# Patient Record
Sex: FEMALE | Race: WHITE
[De-identification: names, ages, dates, MRNs, and addresses within clinical notes are randomized per-mention and may not be internally consistent; named-entity substitution may affect disease eponyms.]

---

## 2017-03-02 ENCOUNTER — HOSPITAL ENCOUNTER (EMERGENCY)
Dept: HOSPITAL 62 - ER | Age: 22
Discharge: HOME | End: 2017-03-02
Payer: SELF-PAY

## 2017-03-02 VITALS — SYSTOLIC BLOOD PRESSURE: 122 MMHG | DIASTOLIC BLOOD PRESSURE: 77 MMHG

## 2017-03-02 DIAGNOSIS — J01.90: Primary | ICD-10-CM

## 2017-03-02 DIAGNOSIS — R05: ICD-10-CM

## 2017-03-02 DIAGNOSIS — F17.200: ICD-10-CM

## 2017-03-02 DIAGNOSIS — R09.82: ICD-10-CM

## 2017-03-02 DIAGNOSIS — R09.81: ICD-10-CM

## 2017-03-02 DIAGNOSIS — J34.89: ICD-10-CM

## 2017-03-02 PROCEDURE — 99283 EMERGENCY DEPT VISIT LOW MDM: CPT

## 2017-03-02 NOTE — ER DOCUMENT REPORT
ED Medical Screen (RME)





- General


Stated Complaint: SINUS CONGESTION


Mode of Arrival: Ambulatory


Information source: Patient


Notes: 


c/o sinus congestion, pain and pressure with green rhinorrhea, headache and 

chest congestion with cough (green mucus) for the past week. Denies fever, 

chills, nausea, vomiting, or diarrhea. 


She has tried allergy medication, OTC decongestants which has not helped. 

Endorses sick contacts.





I have greeted and performed a rapid initial assessment of this patient. A 

comprehensive ED assessment and evaluation of the patient, analysis of test 

results and completion of the medical decision making process will be conducted 

by additional ED providers.


TRAVEL OUTSIDE OF THE [place] IN LAST 30 DAYS: No





- Related Data


Allergies/Adverse Reactions: 


 





amoxicillin [Amoxicillin] Allergy (Severe, Verified 03/02/17 17:28)


 Swelling, Hives











Past Medical History





- Past Medical History


Cardiac Medical History: 


   Denies: Hx Coronary Artery Disease, Hx Heart Attack, Hx Hypertension


Pulmonary Medical History: 


   Denies: Hx Asthma, Hx Bronchitis, Hx COPD, Hx Pneumonia


Neurological Medical History: Denies: Hx Cerebrovascular Accident, Hx Seizures


GI Medical History: 


Musculoskeltal Medical History: Denies Hx Arthritis, Reports Hx Musculoskeletal 

Trauma


Infectious Medical History: 


Past Surgical History: Reports: Hx Cholecystectomy, Hx Hysterectomy.  Denies: 

Hx Pacemaker





- Immunizations


Hx Diphtheria, Pertussis, Tetanus Vaccination: No - unsure





Physical Exam





- Vital signs


Vitals: 


 











Temp Pulse Resp BP Pulse Ox


 


 98.1 F   88   16   141/91 H  100 


 


 03/02/17 17:28  03/02/17 17:28  03/02/17 17:28  03/02/17 17:28  03/02/17 17:28














Course





- Vital Signs


Vital signs: 


 











Temp Pulse Resp BP Pulse Ox


 


 98.1 F   88   16   141/91 H  100 


 


 03/02/17 17:28  03/02/17 17:28  03/02/17 17:28  03/02/17 17:28  03/02/17 17:28

## 2017-03-02 NOTE — ER DOCUMENT REPORT
HPI





- HPI


Patient complains to provider of: sinus drainage and pressure


Onset: Other - 1 week


Onset/Duration: Gradual


Pain Level: Denies


Context: 


21-year-old female with upper respiratory infection for over a week with sinus 

pressure and congestion.  Dark green nasal discharge when she blows her nose 

especially at night.  She has post nasal drip with occasional cough.  She is a 

smoker.  No fever.


Associated Symptoms: None


Exacerbated by: Denies


Relieved by: Denies


Similar symptoms previously: No


Recently seen / treated by doctor: No





- ROS


ROS below otherwise negative: Yes


Systems Reviewed and Negative: Yes All other systems reviewed and negative





- REPRODUCTIVE


LMP: 2/22/17


Reproductive: DENIES: Pregnant:





- DERM


Skin Color: Normal





Past Medical History





- General


Information source: Patient





- Social History


Smoking Status: Current Every Day Smoker


Chew tobacco use (# tins/day): No


Frequency of alcohol use: None


Drug Abuse: None


Family History: Reviewed & Not Pertinent


Patient has suicidal ideation: No


Patient has homicidal ideation: No





- Past Medical History


Cardiac Medical History: 


Pulmonary Medical History: 


Renal/ Medical History: Denies: Hx Peritoneal Dialysis


GI Medical History: 


Musculoskeltal Medical History: Reports Hx Musculoskeletal Trauma


Infectious Medical History: 


Past Surgical History: Reports: Hx Cholecystectomy, Hx Hysterectomy





- Immunizations


Hx Diphtheria, Pertussis, Tetanus Vaccination: No - unsure





Vertical Provider Document





- CONSTITUTIONAL


Agree With Documented VS: Yes


Exam Limitations: No Limitations





- INFECTION CONTROL


TRAVEL OUTSIDE OF THE [place] IN LAST 30 DAYS: No





- HEENT


HEENT: Normocephalic, Pharyngeal Erythema.  negative: Conjuctival Injection, 

Tympanic Membrane Red, Tympanic Membrane Bulging


Notes: 


Boggy naris





- NECK


Neck: Supple.  negative: Lymphadenopathy-Left, Lymphadenopathy-Right





- RESPIRATORY


Respiratory: Breath Sounds Normal, No Respiratory Distress


O2 Sat by Pulse Oximetry: 100





- CARDIOVASCULAR


Cardiovascular: Regular Rate, Regular Rhythm





- GI/ABDOMEN


Gastrointestinal: Abdomen Soft, Abdomen Non-Tender, No Organomegaly





- MUSCULOSKELETAL/EXTREMETIES


Musculoskeletal/Extremeties: MAEW, FROM





- NEURO


Level of Consciousness: Awake, Alert





- DERM


Integumentary: Warm, Dry, No Rash





Course





- Vital Signs


Vital signs: 


 











Temp Pulse Resp BP Pulse Ox


 


 98.1 F   88   16   141/91 H  100 


 


 03/02/17 17:28  03/02/17 17:28  03/02/17 17:28  03/02/17 17:28  03/02/17 17:28














Discharge





- Discharge


Clinical Impression: 


Sinusitis


Qualifiers:


 Sinusitis location: unspecified location Chronicity: acute Recurrence: non-

recurrent Qualified Code(s): J01.90 - Acute sinusitis, unspecified





Condition: Good


Disposition: HOME, SELF-CARE


Instructions:  Sinusitis ([facility]), Azithromycin ([facility]), Stop Smoking ([facility])


Additional Instructions: 


saline nasal spray four times per day


stop smoking


to er if worse


see your provider if persists


warm compress of the sinus


Prescriptions: 


Azithromycin [Zithromax] 250 mg PO DAILY #6 tablet


Forms:  Return to Work

## 2017-07-14 ENCOUNTER — HOSPITAL ENCOUNTER (EMERGENCY)
Dept: HOSPITAL 62 - ER | Age: 22
Discharge: HOME | End: 2017-07-14
Payer: COMMERCIAL

## 2017-07-14 VITALS — DIASTOLIC BLOOD PRESSURE: 76 MMHG | SYSTOLIC BLOOD PRESSURE: 125 MMHG

## 2017-07-14 DIAGNOSIS — Z90.49: ICD-10-CM

## 2017-07-14 DIAGNOSIS — Z90.710: ICD-10-CM

## 2017-07-14 DIAGNOSIS — N76.0: ICD-10-CM

## 2017-07-14 DIAGNOSIS — B96.89: ICD-10-CM

## 2017-07-14 DIAGNOSIS — R30.0: Primary | ICD-10-CM

## 2017-07-14 LAB
APPEARANCE UR: (no result)
BILIRUB UR QL STRIP: NEGATIVE
CHLAM PCR: NOT DETECTED
GLUCOSE UR STRIP-MCNC: NEGATIVE MG/DL
KETONES UR STRIP-MCNC: NEGATIVE MG/DL
NITRITE UR QL STRIP: NEGATIVE
PH UR STRIP: 6 [PH] (ref 5–9)
PROT UR STRIP-MCNC: 30 MG/DL
SP GR UR STRIP: 1.02
UROBILINOGEN UR-MCNC: NEGATIVE MG/DL (ref ?–2)

## 2017-07-14 PROCEDURE — 87591 N.GONORRHOEAE DNA AMP PROB: CPT

## 2017-07-14 PROCEDURE — 96372 THER/PROPH/DIAG INJ SC/IM: CPT

## 2017-07-14 PROCEDURE — 87186 SC STD MICRODIL/AGAR DIL: CPT

## 2017-07-14 PROCEDURE — 99283 EMERGENCY DEPT VISIT LOW MDM: CPT

## 2017-07-14 PROCEDURE — 87210 SMEAR WET MOUNT SALINE/INK: CPT

## 2017-07-14 PROCEDURE — 87088 URINE BACTERIA CULTURE: CPT

## 2017-07-14 PROCEDURE — 87491 CHLMYD TRACH DNA AMP PROBE: CPT

## 2017-07-14 PROCEDURE — 81001 URINALYSIS AUTO W/SCOPE: CPT

## 2017-07-14 PROCEDURE — 87086 URINE CULTURE/COLONY COUNT: CPT

## 2017-07-14 NOTE — ER DOCUMENT REPORT
HPI





- HPI


Pain Level: 3


Notes: 


Patient is a 21-year-old female presents the ED complaining of urinary burning, 

urgency, frequency 1 day.  Patient states that she also developed hematuria 

this morning and pain with urination.  Patient states that she does not drink 

enough water as it is, but is still able to eat and drink without any problems.

  Patient states that she does have a history of UTIs and that the symptoms are 

similar to the past.  Patient does note some lower abdominal pressure but no 

sharp pain.  Denies any back pain.  She has not had any medications for her 

symptoms.  Patient states she is allergic to amoxicillin, develops rash.  She 

does not take any medications daily.  No other significant past medical 

history.  No recent illness, travel, sick contacts.  Patient does smoke but 

does not do any other IV drugs.  Denies any urethral or vaginal discharge.  

Denies any headache, fever, URI, sore throat, cough, wheeze, shortness of breath

, chest pain, palpitations, syncope, nausea/vomiting/diarrhea, urinary retention

, joint pains, or rash.





- ROS


Notes: 


REVIEW OF SYSTEMS:


CONSTITUTIONAL :  Denies fever,  chills, or sweats.  Denies recent illness.


EENT:   Denies eye, ear, throat, or mouth pain or symptoms.  Denies nasal or 

sinus congestion or discharge.  Denies throat, tongue, or mouth swelling or 

difficulty swallowing.


CARDIOVASCULAR:  Denies chest pain.  Denies palpitations or racing or irregular 

heart beat.  Denies ankle edema.


RESPIRATORY:  Denies cough, cold, or chest congestion.  Denies shortness of 

breath, difficulty breathing, or wheezing.


GASTROINTESTINAL:  Denies abdominal pain or distention.  Denies nausea, vomiting

, or diarrhea.  Denies blood in vomitus, stools, or per rectum.  Denies black, 

tarry stools.  Denies constipation.  


GENITOURINARY: See HPI


MUSCULOSKELETAL:  Denies back or neck pain or stiffness.  Denies joint pain or 

swelling.


SKIN:   Denies rash, lesions or sores.





ALL OTHER SYSTEMS REVIEWED AND NEGATIVE.





Dictation was performed using Dragon voice recognition software





- REPRODUCTIVE


Reproductive: DENIES: Pregnant:





- DERM


Skin Color: Normal





Past Medical History





- Social History


Smoking Status: Unknown if Ever Smoked


Family History: Reviewed & Not Pertinent


Patient has suicidal ideation: No


Patient has homicidal ideation: No





- Past Medical History


Cardiac Medical History: 


   Denies: Hx Coronary Artery Disease, Hx Heart Attack, Hx Hypertension


Pulmonary Medical History: 


   Denies: Hx Asthma, Hx Bronchitis, Hx COPD, Hx Pneumonia


Neurological Medical History: Denies: Hx Cerebrovascular Accident, Hx Seizures


Renal/ Medical History: Denies: Hx Peritoneal Dialysis


GI Medical History: 


Musculoskeltal Medical History: Denies Hx Arthritis, Reports Hx Musculoskeletal 

Trauma


Infectious Medical History: 


Past Surgical History: Reports: Hx Cholecystectomy, Hx Hysterectomy.  Denies: 

Hx Pacemaker





- Immunizations


Hx Diphtheria, Pertussis, Tetanus Vaccination: No - unsure





Vertical Provider Document





- CONSTITUTIONAL


Agree With Documented VS: Yes


Notes: 


PHYSICAL EXAMINATION:





GENERAL: Well-appearing, well-nourished and in no acute distress.





NECK: Normal range of motion, supple without lymphadenopathy





LUNGS: Breath sounds clear to auscultation bilaterally and equal.  No wheezes 

rales or rhonchi.





HEART: Regular rate and rhythm without murmurs, rubs, gallops.





ABDOMEN: Soft, nontender, nondistended abdomen.  No guarding, no rebound.  No 

masses appreciated.  Normal bowel sounds present.  No CVA tenderness 

bilaterally.





Female : No inguinal adenopathy.  External genitalia without erythema, lesions

, or masses.  Vaginal mucosa pink.  Cervix parous, pink, and without discharge.

  Uterus is smooth.  No adnexal tenderness. No CMT.





PSYCH: Normal mood, normal affect.





SKIN: Warm, Dry, normal turgor, no rashes or lesions noted.














- INFECTION CONTROL


TRAVEL OUTSIDE OF THE [place] IN LAST 30 DAYS: No





- RESPIRATORY


O2 Sat by Pulse Oximetry: 99





Course





- Re-evaluation


Re-evalutation: 


07/14/17 15:45


Patient is an afebrile, well-hydrated, 21-year-old female presents the ED with 

dysuria, suspect UTI based on H&P today.   Pelvic was performed along with wet 

mount and Chalm/Gonzalez to further assess as wbc clumps were noted in the urine and 

pt noted to have unprotected sex since having her IUD placement 6mos ago.  Wet 

mount shows some bacteria, most likely bacterial vaginosis.  Chlam/Gonzalez pending--

will call pt with results.  1g zithromax and 250mg IM rocephin given as 

precaution today.  Urinalysis shows bacteria byproduct with blood cells.  Urine 

culture pending. I will cover her with Keflex twice a day for 7 days and flagyl 

500mg PO BID x7 days.  Conservative measures otherwise for symptoms.  Recheck 

with your PCM/establish with a PCM this week.  Consider consult with urology.  

Return to the ED with any worsening/concerning symptoms otherwise as reviewed 

in discharge.  Patient is in agreement.














07/14/17 19:47


Chlam/gonzalez negative.  called patient.  no new concerns.





- Vital Signs


Vital signs: 


 











Temp Pulse Resp BP Pulse Ox


 


 98.1 F   55 L  14   126/74 H  99 


 


 07/14/17 11:14  07/14/17 11:14  07/14/17 11:14  07/14/17 11:14  07/14/17 11:14














Procedures





- Pelvic Exam


  ** Pelvic exam


Time completed: 13:40


Cultures obtained: Yes


Wet prep obtained: Yes


Herpes culture obtained: No


Foreign body removed: No


Bimanual exam performed: Yes - negative


Witnessed by: Nurse: Cesia





Discharge





- Discharge


Clinical Impression: 


 Dysuria, Bacterial vaginosis





Condition: Stable


Disposition: HOME, SELF-CARE


Instructions:  Urinary Tract Infection ([facility])


Additional Instructions: 


Push fluids (i.e. water, cranberry juice)


Proper hygenic technique


Keep the skin clean


Take medications as directed


Practice safe sex and use condoms


Tylenol/ibuprofen as needed


May use over the counter AZO for burning with urination


Take medications as directed


F/u/establish with your PCM this week for a recheck


Consider consult with a Urologist for ongoing/worsening symptoms.


Return to the ED with any development of fever, headache, worsening pain, 

increased blood in the urine, flank pain, abdominal pain, n/v, diarrhea, CP, 

shortness of breath, trouble breathing, or any other worsening/concerning 

symptoms otherwise as needed.


Prescriptions: 


Cephalexin Monohydrate [Keflex 500 mg Capsule] 500 mg PO BID #14 capsule


Metronidazole [Flagyl 500 mg Tablet] 500 mg PO BID #14 tablet


Referrals: 


[facility] [Provider Group] - Follow up as needed


[facility] [Provider Group] - Follow up as needed


[facility] [Provider Group] - Follow up as needed

## 2017-09-14 ENCOUNTER — HOSPITAL ENCOUNTER (EMERGENCY)
Dept: HOSPITAL 62 - ER | Age: 22
Discharge: HOME | End: 2017-09-14
Payer: MEDICAID

## 2017-09-14 VITALS — SYSTOLIC BLOOD PRESSURE: 120 MMHG | DIASTOLIC BLOOD PRESSURE: 75 MMHG

## 2017-09-14 DIAGNOSIS — Z88.0: ICD-10-CM
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
## 2017-09-14 DIAGNOSIS — Z90.49: ICD-10-CM

## 2017-09-14 DIAGNOSIS — S90.31XA: Primary | ICD-10-CM
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
## 2017-09-14 DIAGNOSIS — Z90.710: ICD-10-CM

## 2017-09-14 PROCEDURE — 99283 EMERGENCY DEPT VISIT LOW MDM: CPT

## 2017-09-14 PROCEDURE — 73630 X-RAY EXAM OF FOOT: CPT

## 2017-09-14 NOTE — RADIOLOGY REPORT (SQ)
EXAM DESCRIPTION:  FOOT RIGHT COMPLETE



COMPLETED DATE/TIME:  9/14/2017 12:19 pm



REASON FOR STUDY:  foot pain



COMPARISON:  None.



NUMBER OF VIEWS:  Three views.



TECHNIQUE:  AP, lateral and oblique  radiographic images acquired of the right foot.



LIMITATIONS:  None.



FINDINGS:  MINERALIZATION: Normal.

BONES: No acute fracture or dislocation.  No worrisome bone lesions.

JOINTS: No effusions.

SOFT TISSUES: No soft tissue swelling.  No foreign body.

OTHER: No other significant finding.



IMPRESSION:  NEGATIVE STUDY OF THE RIGHT FOOT. NO RADIOGRAPHIC EVIDENCE OF ACUTE INJURY.



TECHNICAL DOCUMENTATION:  JOB ID:  [id number]

 2011 [employer]- All Rights Reserved

## 2017-09-14 NOTE — ER DOCUMENT REPORT
ED General





- General


Stated Complaint: RIGHT FOOT INJURY


Time Seen by Provider: 09/14/17 11:45


Mode of Arrival: Ambulatory


Information source: Patient


Notes: 





Patient is a 22 year old female who presents with right foot pain and swelling 

that started yesterday morning. She states she was running from a lizard on her 

porch and she kicked the edge of her porch. She reports pain is intermittently 

dull and sharp. She is unable to bear weight on right foot. She has tried 

tylenol, last dose this morning, which is not providing relief. Denies any 

numbness, tingling, erythema, ecchymosis or warmth.


TRAVEL OUTSIDE OF THE [place] IN LAST 30 DAYS: No





- Related Data


Allergies/Adverse Reactions: 


 





amoxicillin [Amoxicillin] Allergy (Severe, Verified 03/02/17 17:28)


 Swelling, Hives











Past Medical History





- General


Information source: Patient





- Social History


Smoking Status: Unknown if Ever Smoked


Family History: Reviewed & Not Pertinent





- Past Medical History


Cardiac Medical History: 


   Denies: Hx Coronary Artery Disease, Hx Heart Attack, Hx Hypertension


Pulmonary Medical History: 


   Denies: Hx Asthma, Hx Bronchitis, Hx COPD, Hx Pneumonia


Neurological Medical History: Denies: Hx Cerebrovascular Accident, Hx Seizures


Renal/ Medical History: Denies: Hx Peritoneal Dialysis


GI Medical History: 


Musculoskeltal Medical History: Denies Hx Arthritis, Reports Hx Musculoskeletal 

Trauma


Infectious Medical History: 


Past Surgical History: Reports: Hx Cholecystectomy, Hx Hysterectomy.  Denies: 

Hx Pacemaker





- Immunizations


Hx Diphtheria, Pertussis, Tetanus Vaccination: No - unsure





Review of Systems





- Review of Systems


Constitutional: No symptoms reported


EENT: No symptoms reported


Cardiovascular: No symptoms reported


Respiratory: No symptoms reported


Gastrointestinal: No symptoms reported


Genitourinary: No symptoms reported


Female Genitourinary: No symptoms reported


Musculoskeletal: See HPI


Skin: No symptoms reported


Hematologic/Lymphatic: No symptoms reported


Neurological/Psychological: No symptoms reported





Physical Exam





- Vital signs


Vitals: 


 











Temp Pulse Resp BP Pulse Ox


 


 98.4 F   107 H  16   113/79   99 


 


 09/14/17 11:46  09/14/17 11:46  09/14/17 11:46  09/14/17 11:46  09/14/17 11:46











Interpretation: Tachycardic





- Notes


Notes: 





PHYSICAL EXAM:


CONSTITUTIONAL: Alert and oriented, well-appearing and in no acute distress. 


HENT: Normocephalic, atraumatic.  Moist mucous membranes.


EYES: Pupils equal round and reactive to light, EOM intact. Sclera anicteric, 

conjunctiva are normal. No entrapment. 


HEART: Regular rate and rhythm without murmurs. 


LUNGS: CTAB and equal. No wheezes, rales or rhonchi. 


BACK: nontender, no paraspinous spasm, 5+/5 strengths, DTRs 2+, SLR -. 


EXTREMITIES: right foot - tender to palpation along medial plantar surface with 

swelling and ecchymosis, normal ROM of ankle and toes, no pitting edema. No 

cyanosis. Cap Refill <3 seconds. distal pulses intact.


NEURO: Cranial nerves grossly intact. Normal sensory/motor exams.


PSYCH: Normal mood, normal affect. 


SKIN: Warm and dry. Normal turgor. No rashes or lesions noted.





Course





- Re-evaluation


Re-evalutation: 





09/14/17 11:55


Patient seen and examined. Neurovascular intact. Normal ROM. Will get xray and 

give motrin for pain.





09/14/17 13:41


Reviewed imaging studies - no fractures noted. Offered post-op shoe for comfort

, patient refused. Will give script for pain medication. Discussed supportive 

instructions. 





At this time, will discharge with return precautions and follow-up 

recommendations. Verbal discharge instructions given at the bedside and 

opportunity for questions given. Medication warnings reviewed. Patient is in 

agreement with this plan and has verbalized understanding of return precautions 

and the need for primary care follow-up in the next 24-72 hours. 








- Vital Signs


Vital signs: 


 











Temp Pulse Resp BP Pulse Ox


 


 98.4 F   107 H  16   113/79   99 


 


 09/14/17 11:46  09/14/17 11:46  09/14/17 11:46  09/14/17 11:46  09/14/17 11:46














- Diagnostic Test


Radiology reviewed: Image reviewed, Reports reviewed





Discharge





- Discharge


Clinical Impression: 


 Contusion of right foot, initial encounter





Condition: Stable


Disposition: HOME, SELF-CARE


Additional Instructions: 


Contusion





     Your injury has resulted in a contusion -- a crushing of the deep tissues.

  No injury to important structures was detected during the physician's exam.  

Contusions vary in the amount of pain they cause, and in the length of time 

required for healing.  Typically, the area will become bruised, and will remain 

painful to touch for two or three weeks.  However, most patients are back to 

working and playing within a few days.


     After the initial period of rest and cold-packs, your symptoms (together 

with the doctor's recommendations) will determine how rapidly you can get back 

to full activity.  Usually this means "do what feels okay, but don't do things 

that hurt."


     If re-examination was recommended, it's important to follow up as 

instructed.  Call the doctor or return any time if pain increases, if swelling 

becomes severe, if you develop numbness or weakness in an injured extremity, or 

if any other alarming symptoms occur.





Anti-Inflammatory Medication





     You have received a prescription for an antiinflammatory agent. This is an 

excellent, safe drug for pain control.  In addition, it has potent 

antiinflammatory effects which are beneficial, especially in the treatment of 

injuries, arthritis, or tendonitis.


     It's best to take this medicine with food.  Persons with ulcer disease or 

allergy to aspirin should notify their physician of this before taking this 

drug.


     Take the medication exactly as prescribed.  Don't take additional doses 

unless instructed to do so by your doctor.  If you develop wheezing, shortness 

of breath, hives, faintness, stomach pain, vomiting, or dark black stools, 

return for re-evaluation at once.





Follow up with the provider of your choice in one to two weeks.


Prescriptions: 


Ketorolac Tromethamine [Toradol 10 mg Tablet] 10 mg PO Q8HP PRN #20 tablet


 PRN Reason:

## 2017-09-25 ENCOUNTER — HOSPITAL ENCOUNTER (EMERGENCY)
Dept: HOSPITAL 62 - ER | Age: 22
Discharge: HOME | End: 2017-09-25
Payer: MEDICAID

## 2017-09-25 VITALS — DIASTOLIC BLOOD PRESSURE: 73 MMHG | SYSTOLIC BLOOD PRESSURE: 143 MMHG

## 2017-09-25 DIAGNOSIS — K08.9: Primary | ICD-10-CM

## 2017-09-25 DIAGNOSIS — R03.0: ICD-10-CM

## 2017-09-25 DIAGNOSIS — Z90.710: ICD-10-CM

## 2017-09-25 DIAGNOSIS — F17.200: ICD-10-CM

## 2017-09-25 DIAGNOSIS — Z90.49: ICD-10-CM

## 2017-09-25 PROCEDURE — 99282 EMERGENCY DEPT VISIT SF MDM: CPT

## 2017-09-25 NOTE — ER DOCUMENT REPORT
HPI





- HPI


Patient complains to provider of: Dental pain


Onset: Other - 3 days


Onset/Duration: Persistent


Quality of pain: Sharp


Pain Level: 5


Context: 





Patient presents complaining of dental pain to left upper and lower jaw.  

Patient states she has had a root canal but was unable to afford the crown and 

was told that her tooth would eventually start to give her problems.  Patient 

denies any fever or facial swelling.  Patient states she has been taking 

Tylenol without improvement of her symptoms.


Associated Symptoms: Other - Dental pain.  denies: Fever


Exacerbated by: Denies


Relieved by: Denies


Similar symptoms previously: Yes


Recently seen / treated by doctor: No





- ROS


ROS below otherwise negative: Yes


Systems Reviewed and Negative: Yes All other systems reviewed and negative





- CONSTITUTIONAL


Constitutional: DENIES: Fever





- EENT


Notes: 





Dental pain





- RESPIRATORY


Respiratory: DENIES: Coughing





- GASTROINTESTINAL


Gastrointestinal: DENIES: Nausea





- REPRODUCTIVE


Reproductive: DENIES: Pregnant:





- DERM


Skin Color: Normal


Skin Problems: None





Past Medical History





- General


Information source: Patient





- Social History


Smoking Status: Current Every Day Smoker


Frequency of alcohol use: None


Drug Abuse: None


Occupation: Foodservice


Family History: Reviewed & Not Pertinent





- Past Medical History


Cardiac Medical History: 


   Denies: Hx Coronary Artery Disease, Hx Heart Attack, Hx Hypertension


Pulmonary Medical History: 


   Denies: Hx Asthma, Hx Bronchitis, Hx COPD, Hx Pneumonia


Neurological Medical History: Denies: Hx Cerebrovascular Accident, Hx Seizures


Renal/ Medical History: Denies: Hx Peritoneal Dialysis


GI Medical History: 


Musculoskeltal Medical History: Denies Hx Arthritis, Reports Hx Musculoskeletal 

Trauma


Psychiatric Medical History: Reports: Hx Anxiety, Hx Depression


Infectious Medical History: 


Past Surgical History: Reports: Hx Cholecystectomy, Hx Hysterectomy.  Denies: 

Hx Pacemaker





- Immunizations


Hx Diphtheria, Pertussis, Tetanus Vaccination: No - unsure





Vertical Provider Document





- CONSTITUTIONAL


Agree With Documented VS: Yes


Exam Limitations: No Limitations


General Appearance: WD/WN, No Apparent Distress





- INFECTION CONTROL


TRAVEL OUTSIDE OF THE [place] IN LAST 30 DAYS: No





- HEENT


HEENT: Atraumatic, Normocephalic.  negative: Pharyngeal Exudate, Pharyngeal 

Tenderness, Pharyngeal Erythema, Tympanic Membrane Red, Tympanic Membrane 

Bulging


Mouth Diagram: 


  __________________________














  __________________________





 1 - tender, erupting 3rd molar





 2 - tender, dental caries, no gingival abscess








- NECK


Neck: Normal Inspection, Supple.  negative: Lymphadenopathy-Left, 

Lymphadenopathy-Right





- RESPIRATORY


Respiratory: Breath Sounds Normal, No Respiratory Distress


O2 Sat by Pulse Oximetry: 100





- CARDIOVASCULAR


Cardiovascular: Regular Rate, Regular Rhythm, No Murmur





- MUSCULOSKELETAL/EXTREMETIES


Musculoskeletal/Extremeties: MAEW





- NEURO


Level of Consciousness: Awake, Alert, Appropriate


Motor/Sensory: No Motor Deficit





- DERM


Integumentary: Warm, Dry





Course





- Re-evaluation


Re-evalutation: 





09/25/17 11:12


The patient has been informed that they may have pre-hypertension or 

hypertension based on a blood pressure reading in the emergency department.  I 

recommend that patient call the primary care provider listed on their discharge 

instructions or a physician of their choice by this week to arrange follow-up 

for further evaluation of possible pre-hypertension or hypertension.





- Vital Signs


Vital signs: 


 











Temp Pulse Resp BP Pulse Ox


 


 98.1 F   52 L  20   143/73 H  100 


 


 09/25/17 10:32  09/25/17 10:32  09/25/17 10:32  09/25/17 10:32  09/25/17 10:32














Discharge





- Discharge


Clinical Impression: 


 Toothache, Elevated blood pressure reading





Condition: Stable


Disposition: HOME, SELF-CARE


Instructions:  Clindamycin ([facility]), Dentist, Oral Narcotic Medication ([facility]), 

Toothache ([facility])


Additional Instructions: 


Return immediately for any new or worsening symptoms





Followup with your dental care provider, call tomorrow to make a followup 

appointment








Prescriptions: 


Acetaminophen with Codeine [Acetaminophen-Cod #3 Tablet] 1 each PO Q6 PRN #12 

tablet


 PRN Reason: 


Clindamycin HCl [Cleocin 300 mg Capsule] 300 mg PO TID #21 capsule


Naproxen [Naprosyn 250 Nmg Tablet] 1 tab PO BID #14 tablet


Forms:  Elevated Blood Pressure, Return to Work


Referrals: 


[facility] [Provider Group] - Follow up tomorrow

## 2018-05-23 ENCOUNTER — HOSPITAL ENCOUNTER (EMERGENCY)
Dept: HOSPITAL 62 - ER | Age: 23
LOS: 1 days | Discharge: HOME | End: 2018-05-24
Payer: SELF-PAY

## 2018-05-23 DIAGNOSIS — R35.0: ICD-10-CM

## 2018-05-23 DIAGNOSIS — R30.0: ICD-10-CM

## 2018-05-23 DIAGNOSIS — N30.01: Primary | ICD-10-CM

## 2018-05-23 PROCEDURE — 81025 URINE PREGNANCY TEST: CPT

## 2018-05-23 PROCEDURE — 99283 EMERGENCY DEPT VISIT LOW MDM: CPT

## 2018-05-23 PROCEDURE — 81001 URINALYSIS AUTO W/SCOPE: CPT

## 2018-05-24 VITALS — SYSTOLIC BLOOD PRESSURE: 112 MMHG | DIASTOLIC BLOOD PRESSURE: 68 MMHG

## 2018-05-24 LAB
APPEARANCE UR: (no result)
APTT PPP: YELLOW S
BILIRUB UR QL STRIP: NEGATIVE
GLUCOSE UR STRIP-MCNC: NEGATIVE MG/DL
KETONES UR STRIP-MCNC: NEGATIVE MG/DL
NITRITE UR QL STRIP: POSITIVE
PH UR STRIP: 6 [PH] (ref 5–9)
PROT UR STRIP-MCNC: 30 MG/DL
SP GR UR STRIP: 1.01
UROBILINOGEN UR-MCNC: 4 MG/DL (ref ?–2)

## 2018-05-24 NOTE — ER DOCUMENT REPORT
HPI





- HPI


Pain Level: 3


Context: 





Patient is a 22-year-old female comes emergency department for chief complaint 

of worsening painful urination and frequency for the past 3 days.  She states 

that she has also noticed some blood in her urine.  She denies flank pain, 

nausea vomiting, fever or chills.  LMP within the past month.  She denies any 

abnormal discharge or specific abdominal pain unless she is urinating.  





- REPRODUCTIVE


Reproductive: DENIES: Pregnant:





- DERM


Skin Color: Normal, Pink





Past Medical History





- General


Information source: Patient





- Social History


Smoking Status: Current Every Day Smoker


Chew tobacco use (# tins/day): No


Frequency of alcohol use: None


Drug Abuse: None


Lives with: Family


Family History: Reviewed & Not Pertinent


Patient has suicidal ideation: No


Patient has homicidal ideation: No





- Past Medical History


Cardiac Medical History: 


   Denies: Hx Coronary Artery Disease, Hx Heart Attack, Hx Hypertension


Pulmonary Medical History: 


   Denies: Hx Asthma, Hx Bronchitis, Hx COPD, Hx Pneumonia


Neurological Medical History: Denies: Hx Cerebrovascular Accident, Hx Seizures


Renal/ Medical History: Denies: Hx Peritoneal Dialysis


GI Medical History: 


Musculoskeltal Medical History: Denies Hx Arthritis, Reports Hx Musculoskeletal 

Trauma


Psychiatric Medical History: Reports: Hx Anxiety, Hx Depression


Infectious Medical History: 


Past Surgical History: Reports: Hx Cholecystectomy.  Denies: Hx Pacemaker





- Immunizations


Hx Diphtheria, Pertussis, Tetanus Vaccination: No - unsure





Vertical Provider Document





- CONSTITUTIONAL


General Appearance: WD/WN, No Apparent Distress





- INFECTION CONTROL


TRAVEL OUTSIDE OF THE [place] IN LAST 30 DAYS: No





- HEENT


HEENT: Atraumatic, Normal ENT Exam, Normocephalic





- NECK


Neck: Normal Inspection





- RESPIRATORY


Respiratory: Breath Sounds Normal, No Respiratory Distress





- CARDIOVASCULAR


Cardiovascular: Regular Rate, Regular Rhythm





- GI/ABDOMEN


Gastrointestinal: Abdomen Soft.  negative: Abdomen Non-Tender - Tenderness over 

the suprapubic area, otherwise unremarkable abdominal and pelvic examination, 

no guarding, no rigidity





- BACK


Back: Normal Inspection.  negative: CVA Tenderness-Right, CVA Tenderness-Left





- MUSCULOSKELETAL/EXTREMETIES


Musculoskeletal/Extremeties: MAEW, FROM, Non-Tender





- NEURO


Level of Consciousness: Awake, Alert, Appropriate





- DERM


Integumentary: Warm, Dry, No Rash





Course





- Re-evaluation


Re-evalutation: 


Patient with suprapubic tenderness, dysuria, obvious urinary tract infection.  

No CVA tenderness, nausea or vomiting, fever or chills.  Consistent with 

cystitis.  Low suspicion of pyelonephritis or infected ureteral stone.  

Beginning antibiotics, discussed follow-up and return precautions, patient 

states understanding and agreement.





- Vital Signs


Vital signs: 


 











Temp Pulse Resp BP Pulse Ox


 


 98.9 F   80   18   125/78   99 


 


 05/23/18 22:50  05/23/18 22:50  05/23/18 22:50  05/23/18 22:50  05/23/18 22:50














Discharge





- Discharge


Clinical Impression: 


 Dysuria





Urinary tract infection


Qualifiers:


 Urinary tract infection type: acute cystitis Hematuria presence: with 

hematuria Qualified Code(s): N30.01 - Acute cystitis with hematuria





Disposition: HOME, SELF-CARE


Prescriptions: 


Cephalexin Monohydrate [Keflex 500 mg Capsule] 500 mg PO QID #20 capsule


Phenazopyridine HCl [Pyridium 100 Mg Tablet] 200 mg PO TID PRN #6 tablet


 PRN Reason: 


Forms:  Return to Work